# Patient Record
Sex: FEMALE | Race: WHITE | NOT HISPANIC OR LATINO | URBAN - METROPOLITAN AREA
[De-identification: names, ages, dates, MRNs, and addresses within clinical notes are randomized per-mention and may not be internally consistent; named-entity substitution may affect disease eponyms.]

---

## 2016-03-24 RX ORDER — DOCUSATE SODIUM 100 MG
1 CAPSULE ORAL
Qty: 0 | Refills: 0 | COMMUNITY
Start: 2016-03-24 | End: 2016-04-23

## 2016-03-25 RX ORDER — LEVETIRACETAM 250 MG/1
1 TABLET, FILM COATED ORAL
Qty: 0 | Refills: 0 | COMMUNITY
Start: 2016-03-25 | End: 2016-04-24

## 2018-08-23 ENCOUNTER — INPATIENT (INPATIENT)
Facility: HOSPITAL | Age: 83
LOS: 0 days | Discharge: HOME | End: 2018-08-24
Attending: HOSPITALIST | Admitting: HOSPITALIST
Payer: MEDICARE

## 2018-08-23 VITALS
RESPIRATION RATE: 16 BRPM | TEMPERATURE: 98 F | SYSTOLIC BLOOD PRESSURE: 148 MMHG | HEART RATE: 82 BPM | DIASTOLIC BLOOD PRESSURE: 81 MMHG | OXYGEN SATURATION: 98 %

## 2018-08-23 DIAGNOSIS — E03.9 HYPOTHYROIDISM, UNSPECIFIED: ICD-10-CM

## 2018-08-23 DIAGNOSIS — I48.1 PERSISTENT ATRIAL FIBRILLATION: ICD-10-CM

## 2018-08-23 DIAGNOSIS — R41.82 ALTERED MENTAL STATUS, UNSPECIFIED: ICD-10-CM

## 2018-08-23 DIAGNOSIS — Z29.9 ENCOUNTER FOR PROPHYLACTIC MEASURES, UNSPECIFIED: ICD-10-CM

## 2018-08-23 DIAGNOSIS — K57.31 DIVERTICULOSIS OF LARGE INTESTINE WITHOUT PERFORATION OR ABSCESS WITH BLEEDING: ICD-10-CM
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
## 2018-08-23 DIAGNOSIS — G47.00 INSOMNIA, UNSPECIFIED: ICD-10-CM

## 2018-08-23 DIAGNOSIS — R53.1 WEAKNESS: ICD-10-CM

## 2018-08-23 LAB
ALBUMIN SERPL ELPH-MCNC: 3 G/DL — LOW (ref 3.3–5)
ALP SERPL-CCNC: 136 U/L — HIGH (ref 40–120)
ALT FLD-CCNC: 33 U/L — SIGNIFICANT CHANGE UP (ref 4–33)
APPEARANCE UR: CLEAR — SIGNIFICANT CHANGE UP
AST SERPL-CCNC: 100 U/L — HIGH (ref 4–32)
BASOPHILS # BLD AUTO: 0.03 K/UL — SIGNIFICANT CHANGE UP (ref 0–0.2)
BASOPHILS NFR BLD AUTO: 0.4 % — SIGNIFICANT CHANGE UP (ref 0–2)
BILIRUB SERPL-MCNC: 0.8 MG/DL — SIGNIFICANT CHANGE UP (ref 0.2–1.2)
BILIRUB UR-MCNC: NEGATIVE — SIGNIFICANT CHANGE UP
BLOOD UR QL VISUAL: NEGATIVE — SIGNIFICANT CHANGE UP
BUN SERPL-MCNC: 11 MG/DL — SIGNIFICANT CHANGE UP (ref 7–23)
BUN SERPL-MCNC: 12 MG/DL — SIGNIFICANT CHANGE UP (ref 7–23)
CALCIUM SERPL-MCNC: 8.6 MG/DL — SIGNIFICANT CHANGE UP (ref 8.4–10.5)
CALCIUM SERPL-MCNC: 9.3 MG/DL — SIGNIFICANT CHANGE UP (ref 8.4–10.5)
CHLORIDE SERPL-SCNC: 94 MMOL/L — LOW (ref 98–107)
CHLORIDE SERPL-SCNC: 97 MMOL/L — LOW (ref 98–107)
CO2 SERPL-SCNC: 22 MMOL/L — SIGNIFICANT CHANGE UP (ref 22–31)
CO2 SERPL-SCNC: 26 MMOL/L — SIGNIFICANT CHANGE UP (ref 22–31)
COLOR SPEC: YELLOW — SIGNIFICANT CHANGE UP
CREAT SERPL-MCNC: 0.66 MG/DL — SIGNIFICANT CHANGE UP (ref 0.5–1.3)
CREAT SERPL-MCNC: 0.7 MG/DL — SIGNIFICANT CHANGE UP (ref 0.5–1.3)
EOSINOPHIL # BLD AUTO: 0.02 K/UL — SIGNIFICANT CHANGE UP (ref 0–0.5)
EOSINOPHIL NFR BLD AUTO: 0.3 % — SIGNIFICANT CHANGE UP (ref 0–6)
GLUCOSE SERPL-MCNC: 86 MG/DL — SIGNIFICANT CHANGE UP (ref 70–99)
GLUCOSE SERPL-MCNC: 91 MG/DL — SIGNIFICANT CHANGE UP (ref 70–99)
GLUCOSE UR-MCNC: NEGATIVE — SIGNIFICANT CHANGE UP
HCT VFR BLD CALC: 41.1 % — SIGNIFICANT CHANGE UP (ref 34.5–45)
HGB BLD-MCNC: 13.4 G/DL — SIGNIFICANT CHANGE UP (ref 11.5–15.5)
IMM GRANULOCYTES # BLD AUTO: 0.03 # — SIGNIFICANT CHANGE UP
IMM GRANULOCYTES NFR BLD AUTO: 0.4 % — SIGNIFICANT CHANGE UP (ref 0–1.5)
KETONES UR-MCNC: NEGATIVE — SIGNIFICANT CHANGE UP
LEUKOCYTE ESTERASE UR-ACNC: NEGATIVE — SIGNIFICANT CHANGE UP
LYMPHOCYTES # BLD AUTO: 0.68 K/UL — LOW (ref 1–3.3)
LYMPHOCYTES # BLD AUTO: 9.3 % — LOW (ref 13–44)
MAGNESIUM SERPL-MCNC: 2.1 MG/DL — SIGNIFICANT CHANGE UP (ref 1.6–2.6)
MCHC RBC-ENTMCNC: 29.1 PG — SIGNIFICANT CHANGE UP (ref 27–34)
MCHC RBC-ENTMCNC: 32.6 % — SIGNIFICANT CHANGE UP (ref 32–36)
MCV RBC AUTO: 89.2 FL — SIGNIFICANT CHANGE UP (ref 80–100)
MONOCYTES # BLD AUTO: 0.59 K/UL — SIGNIFICANT CHANGE UP (ref 0–0.9)
MONOCYTES NFR BLD AUTO: 8.1 % — SIGNIFICANT CHANGE UP (ref 2–14)
NEUTROPHILS # BLD AUTO: 5.96 K/UL — SIGNIFICANT CHANGE UP (ref 1.8–7.4)
NEUTROPHILS NFR BLD AUTO: 81.5 % — HIGH (ref 43–77)
NITRITE UR-MCNC: NEGATIVE — SIGNIFICANT CHANGE UP
NRBC # FLD: 0 — SIGNIFICANT CHANGE UP
PH UR: 8 — SIGNIFICANT CHANGE UP (ref 5–8)
PHOSPHATE SERPL-MCNC: 3.5 MG/DL — SIGNIFICANT CHANGE UP (ref 2.5–4.5)
PLATELET # BLD AUTO: 208 K/UL — SIGNIFICANT CHANGE UP (ref 150–400)
PMV BLD: 10.3 FL — SIGNIFICANT CHANGE UP (ref 7–13)
POTASSIUM SERPL-MCNC: 4.6 MMOL/L — SIGNIFICANT CHANGE UP (ref 3.5–5.3)
POTASSIUM SERPL-MCNC: SIGNIFICANT CHANGE UP MMOL/L (ref 3.5–5.3)
POTASSIUM SERPL-SCNC: 4.6 MMOL/L — SIGNIFICANT CHANGE UP (ref 3.5–5.3)
POTASSIUM SERPL-SCNC: SIGNIFICANT CHANGE UP MMOL/L (ref 3.5–5.3)
PROT SERPL-MCNC: 6.9 G/DL — SIGNIFICANT CHANGE UP (ref 6–8.3)
PROT UR-MCNC: SIGNIFICANT CHANGE UP
RBC # BLD: 4.61 M/UL — SIGNIFICANT CHANGE UP (ref 3.8–5.2)
RBC # FLD: 15 % — HIGH (ref 10.3–14.5)
SODIUM SERPL-SCNC: 132 MMOL/L — LOW (ref 135–145)
SODIUM SERPL-SCNC: 135 MMOL/L — SIGNIFICANT CHANGE UP (ref 135–145)
SP GR SPEC: 1.01 — SIGNIFICANT CHANGE UP (ref 1–1.04)
TROPONIN T, HIGH SENSITIVITY: 20 NG/L — SIGNIFICANT CHANGE UP (ref ?–14)
TROPONIN T, HIGH SENSITIVITY: 23 NG/L — SIGNIFICANT CHANGE UP (ref ?–14)
TSH SERPL-MCNC: SIGNIFICANT CHANGE UP UIU/ML (ref 0.27–4.2)
UROBILINOGEN FLD QL: NORMAL — SIGNIFICANT CHANGE UP
WBC # BLD: 7.31 K/UL — SIGNIFICANT CHANGE UP (ref 3.8–10.5)
WBC # FLD AUTO: 7.31 K/UL — SIGNIFICANT CHANGE UP (ref 3.8–10.5)

## 2018-08-23 PROCEDURE — 73522 X-RAY EXAM HIPS BI 3-4 VIEWS: CPT | Mod: 26

## 2018-08-23 PROCEDURE — 70450 CT HEAD/BRAIN W/O DYE: CPT | Mod: 26

## 2018-08-23 PROCEDURE — 72125 CT NECK SPINE W/O DYE: CPT | Mod: 26

## 2018-08-23 PROCEDURE — 99223 1ST HOSP IP/OBS HIGH 75: CPT

## 2018-08-23 PROCEDURE — 71045 X-RAY EXAM CHEST 1 VIEW: CPT | Mod: 26

## 2018-08-23 PROCEDURE — 73030 X-RAY EXAM OF SHOULDER: CPT | Mod: 26,RT

## 2018-08-23 RX ORDER — ACETAMINOPHEN 500 MG
650 TABLET ORAL ONCE
Qty: 0 | Refills: 0 | Status: DISCONTINUED | OUTPATIENT
Start: 2018-08-23 | End: 2018-08-23

## 2018-08-23 RX ORDER — HEPARIN SODIUM 5000 [USP'U]/ML
5000 INJECTION INTRAVENOUS; SUBCUTANEOUS EVERY 12 HOURS
Qty: 0 | Refills: 0 | Status: DISCONTINUED | OUTPATIENT
Start: 2018-08-23 | End: 2018-08-24

## 2018-08-23 RX ORDER — CLOPIDOGREL BISULFATE 75 MG/1
1 TABLET, FILM COATED ORAL
Qty: 0 | Refills: 0 | COMMUNITY

## 2018-08-23 RX ORDER — ATORVASTATIN CALCIUM 80 MG/1
20 TABLET, FILM COATED ORAL AT BEDTIME
Qty: 0 | Refills: 0 | Status: DISCONTINUED | OUTPATIENT
Start: 2018-08-23 | End: 2018-08-24

## 2018-08-23 RX ORDER — ACETAMINOPHEN 500 MG
1000 TABLET ORAL ONCE
Qty: 0 | Refills: 0 | Status: COMPLETED | OUTPATIENT
Start: 2018-08-23 | End: 2018-08-23

## 2018-08-23 RX ORDER — RISPERIDONE 4 MG/1
0.5 TABLET ORAL AT BEDTIME
Qty: 0 | Refills: 0 | Status: DISCONTINUED | OUTPATIENT
Start: 2018-08-23 | End: 2018-08-24

## 2018-08-23 RX ORDER — MORPHINE SULFATE 50 MG/1
2 CAPSULE, EXTENDED RELEASE ORAL ONCE
Qty: 0 | Refills: 0 | Status: DISCONTINUED | OUTPATIENT
Start: 2018-08-23 | End: 2018-08-23

## 2018-08-23 RX ORDER — FUROSEMIDE 40 MG
20 TABLET ORAL DAILY
Qty: 0 | Refills: 0 | Status: DISCONTINUED | OUTPATIENT
Start: 2018-08-23 | End: 2018-08-24

## 2018-08-23 RX ORDER — CLOPIDOGREL BISULFATE 75 MG/1
75 TABLET, FILM COATED ORAL DAILY
Qty: 0 | Refills: 0 | Status: DISCONTINUED | OUTPATIENT
Start: 2018-08-23 | End: 2018-08-24

## 2018-08-23 RX ORDER — LEVETIRACETAM 250 MG/1
500 TABLET, FILM COATED ORAL DAILY
Qty: 0 | Refills: 0 | Status: DISCONTINUED | OUTPATIENT
Start: 2018-08-23 | End: 2018-08-24

## 2018-08-23 RX ORDER — LEVOTHYROXINE SODIUM 125 MCG
88 TABLET ORAL DAILY
Qty: 0 | Refills: 0 | Status: DISCONTINUED | OUTPATIENT
Start: 2018-08-23 | End: 2018-08-24

## 2018-08-23 RX ORDER — METOPROLOL TARTRATE 50 MG
50 TABLET ORAL DAILY
Qty: 0 | Refills: 0 | Status: DISCONTINUED | OUTPATIENT
Start: 2018-08-23 | End: 2018-08-24

## 2018-08-23 RX ORDER — FUROSEMIDE 40 MG
1 TABLET ORAL
Qty: 0 | Refills: 0 | COMMUNITY

## 2018-08-23 RX ADMIN — Medication 1000 MILLIGRAM(S): at 14:07

## 2018-08-23 RX ADMIN — ATORVASTATIN CALCIUM 20 MILLIGRAM(S): 80 TABLET, FILM COATED ORAL at 21:42

## 2018-08-23 RX ADMIN — Medication 400 MILLIGRAM(S): at 13:19

## 2018-08-23 NOTE — ED PROVIDER NOTE - PROGRESS NOTE DETAILS
Daughter felt pt is more lethargic but notes she has not slept in 3 days. Pt arousable, no focal deficits. Will admit to continue to monitor for improvement vs worsening dementia.

## 2018-08-23 NOTE — ED PROVIDER NOTE - PMH
Atrial fibrillation  not anticoagulated due to exacerbation of diverticulosis  Breast cancer  left, s/p partial wedge and chemo  CVA (cerebral vascular accident)  hemiparesis dysarthria, facial droop; TPA on 12/5/15, resolution of sx  Diverticulitis    Diverticulosis of colon with hemorrhage    Forgetfulness  not dx'd with dementia as per daughter  Hypothyroidism    Mitral valve regurgitation    Pulmonary hypertension  65mmHg 12/9/15  Rectocele    Uterine prolapse    Vitamin D deficiency

## 2018-08-23 NOTE — ED ADULT NURSE NOTE - OBJECTIVE STATEMENT
pt brought to ed by ems pt fell this morning hurting her left side  daughter at bedside  daughter lives with pt and pts   daughter was sleeping when pt fell  pts daughter states pt has been experiencing hallucinations for past few days and increasing confusion   saline lock 22 g angio right hand  labs sent per orders witals above  ed md denton  in progress will continue to monitor

## 2018-08-23 NOTE — ED PROVIDER NOTE - ATTENDING CONTRIBUTION TO CARE
Pt was seen and evaluated by me. Pt is a 89 y/o female with PMHx of A-fib, CVA, and previous subdural hematoma s/p previous fall presenting to the ED for a fall. Daughter states pt has been at home where she has been managing her care but pt has been up for the past 72 hours. Pt was sitting in bed smalls she reached for something and then fell over. Daughter denies pt had any LOC or vomiting. Pt awake and oriented to person. No abrasions or contusions noted. No midline tenderness. Moving all extremities. Lungs CTA b/l. RRR. Abd soft, non-tender. + distal pulses.

## 2018-08-23 NOTE — ED PROVIDER NOTE - PHYSICAL EXAMINATION
General: well appearing female, no acute distress   HEENT: normocephalic, atraumatic, no cervical midline spinal tenderness   Respiratory: normal work of breathing, lungs clear to auscultation bilaterally   Cardiac: regular rate and rhythm   Abdomen: soft, non-tender   MSK: right hip tenderness to palpation, no tenderness to extremities   Skin: no rashes   Neuro: A&Ox1

## 2018-08-23 NOTE — H&P ADULT - ASSESSMENT
PaulettecrispinAngie is a 89 y/o Female w/PMH Afib (not on anticoagulation b/o diverticulosis bleeding), breast cancer, CVA ( s/p tpa on 12/15), hypothyroidism, pulmonary hypertension, rectocele and uterine prolapse. Patient was brought by daughter for a fall and altered mental status associated with no sleep for the past 72 hours. Imaging including Head/Spinal CT, CXR, Hip xray, and shoulder xray do not show evidence of fracture or bleeding. As per daughter, the patient is usually alert and oriented x3, but has moments of confusion and hallucinations, especially over the past 3 years. No diagnosis of dementia.

## 2018-08-23 NOTE — H&P ADULT - PROBLEM SELECTOR PLAN 1
No source of infection (no fever, urine and cxr both clear)  Delirium labs (RPR, B12, TSH, Free T4)  Daughter to stay with patient while in the hospital No source of infection (no fever, urine and cxr both clear)  Delirium labs (RPR, B12, TSH, Free T4)  If no improvement tomorrow, consider Neuro consult for further evaluation  Daughter to stay with patient while in the hospital

## 2018-08-23 NOTE — ED PROVIDER NOTE - OBJECTIVE STATEMENT
89F, PMH of afib, CVA, subdural hematoma s/p fall, presenting with a fall. History obtained from daughter. Daughter reports the patient has been awake for the past 72 hours. This morning she was playing with a box and the daughter believes she reached out to pick something up and then fell over. No LOC. Complaining of left shoulder pain. Patient denies hitting her head, chest pain, shortness of breathing, abdominal pain.

## 2018-08-23 NOTE — ED ADULT NURSE REASSESSMENT NOTE - NS ED NURSE REASSESS COMMENT FT1
Daughter expressed concern about patient's mental status. Pt asleep most of the time but arousable to verbal stimuli. Dtr reports that patient is more alert and communicative. VS remains stable. MD Conway assessed patient, pt to be admitted.

## 2018-08-23 NOTE — H&P ADULT - HISTORY OF PRESENT ILLNESS
HPI:    Angie Daniels is a 89 y/o Female w/PMH Afib (not on anticoagulation b/o diverticulosis bleeding), breast cancer, CVA ( s/p tpa on 12/15), hypothyroidism, pulmonary hypertension, rectocele and uterine prolapse. The patient was brought in by her daughter who is the main caretaker for this patient. She states that over the past 72 hours the patient did not sleep, she has started to hallucinate seeing small kids around her and people coming through her windows. This morning the patient's daughter heard the patient fall while she was on the commode, when she came in the patient was noted to be on the floor. There was no sign of head trauma or LOC. The patient knows that she is currently in the hospital, but is very slow to respond, overall appearing very lethargic. As per daughter, generally the patient is alert and oriented x3 but with times of fluctuation and hallucinations, especially over the last 3 years.      PAST MEDICAL & SURGICAL HISTORY:  CVA (cerebral vascular accident): hemiparesis dysarthria, facial droop; TPA on 12/5/15, resolution of sx  Pulmonary hypertension: 65mmHg 12/9/15  Forgetfulness: not dx&#x27;d with dementia as per daughter  Diverticulitis  Diverticulosis of colon with hemorrhage  Vitamin D deficiency  Rectocele  Uterine prolapse  Breast cancer: left, s/p partial wedge and chemo  Hypothyroidism  Mitral valve regurgitation  Atrial fibrillation: not anticoagulated due to exacerbation of diverticulosis  H/O hernia repair  H/O: hysterectomy  H/O left mastectomy      Review of Systems:   Unobtainable secondary to mental status    Allergies    ibuprofen (Unknown)  latex (Hives)  NO PARSLEY (Nausea)  Rubber products (Other; Rash)    Intolerances    aspirin (Other)  Coumadin (Other)      Social History:   Patient lives with her daughter and her 97 year old  who is bedbound. Daughter takes care of both with some help.    FAMILY HISTORY:  No pertinent family history in first degree relatives      MEDICATIONS  (STANDING):  heparin  Injectable 5000 Unit(s) SubCutaneous every 12 hours    MEDICATIONS  (PRN):      T(C): 36 (18 @ 16:20), Max: 36.8 (18 @ 10:48)  HR: 69 (18 @ 16:20) (69 - 82)  BP: 130/67 (18 @ 16:20) (130/67 - 158/89)  RR: 18 (18 @ 16:20) (16 - 20)  SpO2: 98% (18 @ 16:20) (98% - 100%)    CAPILLARY BLOOD GLUCOSE        I&O's Summary      PHYSICAL EXAM:  GENERAL: NAD, well-developed, slow to respond. Partially alert to place (knows that she is in a hospital)  HEAD:  Atraumatic, Normocephalic  EYES: PERRLA  NECK: No elevated JVD  CHEST/LUNG: very poor inspiratory effort, no wheezing or crackles noted.  HEART: Regular rate and rhythm; No murmurs, rubs, or gallops  ABDOMEN: Soft, Nondistended; Bowel sounds present  EXTREMITIES:  2+ Peripheral Pulses, Pitting +2 edema B/L  PSYCH: Lethargic  NEUROLOGY: CN II-XII grossly intact, moving all extremities  SKIN: No rashes or lesions    LABS:                        13.4   7.31  )-----------( 208      ( 23 Aug 2018 12:35 )             41.1         135  |  97<L>  |  11  ----------------------------<  86  4.6   |  26  |  0.70    Ca    8.6      23 Aug 2018 16:00  Phos  3.5       Mg     2.1         TPro  6.9  /  Alb  3.0<L>  /  TBili  0.8  /  DBili  x   /  AST  100<H>  /  ALT  33  /  AlkPhos  136<H>            Urinalysis Basic - ( 23 Aug 2018 11:40 )    Color: YELLOW / Appearance: CLEAR / S.014 / pH: 8.0  Gluc: NEGATIVE / Ketone: NEGATIVE  / Bili: NEGATIVE / Urobili: NORMAL   Blood: NEGATIVE / Protein: TRACE / Nitrite: NEGATIVE   Leuk Esterase: NEGATIVE / RBC: x / WBC x   Sq Epi: x / Non Sq Epi: x / Bacteria: x          RADIOLOGY & ADDITIONAL TESTS:    Imaging Personally Reviewed:  Head CT, Cervical CT, Hip X-ray and CXR- performed. No acute changes noted.

## 2018-08-23 NOTE — ED PROVIDER NOTE - MEDICAL DECISION MAKING DETAILS
89F presenting s/p fall. likely mechanical fall however patient is not a reliable historian. previous h/o intracranial hemorrhage. insomnia likely 2/2 acute worsening dementia. concern for syncope vs mechanical fall vs hip fracture. plan for cbc, cmp, trop, tsh, ua, cxr, xray hip, xray shoulder, ct head. will reassess need for admission.

## 2018-08-23 NOTE — SOCIAL WORK INITIAL EVALUATION ADULT - PLAN
Spoke with daughter at bedside in ED.  Patient sleeping.  Patient is a 89 year old , white, [employer] female.  Per daughter, she is alert and oriented x1, with some agitation and confusion.  Daughter reports that patient has not been formally diagnosed with dementia.  Patient uses a walker to ambulate, though daughter reports patient has been unable to walk much for the past few days.    Patient lives with 97 year old spouse who is bedbound and daughter, who cares for both parents with assist from private aide 2x/week.  They live in first floor co-op with 6 steps to enter.   Daughter is retired [employer] [facility] home care RN.  Daughter requesting [employer] [facility] HC (RN, PT, HHA) when patient ready for DC home.  CM and SW to follow.

## 2018-08-23 NOTE — PROVIDER CONTACT NOTE (OTHER) - RECOMMENDATIONS
Patient and family in agreement with the discharge plan. Patient and family in agreement with the discharge plan.   case accepted for SOC tomorrow 08/24

## 2018-08-23 NOTE — ED ADULT TRIAGE NOTE - CHIEF COMPLAINT QUOTE
Pt BIBEMS for witnessed fall this morning, pt was sitting on bed when she fell over, denies any present pain, on plavix, denies cp/discomfort, denies headache/dizziness, breathing even and unlabored.

## 2018-08-24 ENCOUNTER — TRANSCRIPTION ENCOUNTER (OUTPATIENT)
Age: 83
End: 2018-08-24

## 2018-08-24 VITALS
SYSTOLIC BLOOD PRESSURE: 127 MMHG | TEMPERATURE: 97 F | RESPIRATION RATE: 15 BRPM | HEART RATE: 79 BPM | OXYGEN SATURATION: 100 % | DIASTOLIC BLOOD PRESSURE: 56 MMHG

## 2018-08-24 DIAGNOSIS — F03.91 UNSPECIFIED DEMENTIA WITH BEHAVIORAL DISTURBANCE: ICD-10-CM

## 2018-08-24 DIAGNOSIS — R44.3 HALLUCINATIONS, UNSPECIFIED: ICD-10-CM

## 2018-08-24 DIAGNOSIS — F05 DELIRIUM DUE TO KNOWN PHYSIOLOGICAL CONDITION: ICD-10-CM

## 2018-08-24 LAB
FOLATE SERPL-MCNC: > 20 NG/ML — HIGH (ref 4.7–20)
T4 FREE SERPL-MCNC: 1.54 NG/DL — SIGNIFICANT CHANGE UP (ref 0.9–1.8)
TSH SERPL-MCNC: < 0.1 UIU/ML — LOW (ref 0.27–4.2)
VIT B12 SERPL-MCNC: 425 PG/ML — SIGNIFICANT CHANGE UP (ref 200–900)

## 2018-08-24 PROCEDURE — 90792 PSYCH DIAG EVAL W/MED SRVCS: CPT

## 2018-08-24 PROCEDURE — 99239 HOSP IP/OBS DSCHRG MGMT >30: CPT

## 2018-08-24 PROCEDURE — 93010 ELECTROCARDIOGRAM REPORT: CPT

## 2018-08-24 RX ORDER — RISPERIDONE 4 MG/1
0 TABLET ORAL
Qty: 0 | Refills: 0 | COMMUNITY

## 2018-08-24 RX ORDER — RISPERIDONE 4 MG/1
0.5 TABLET ORAL EVERY 6 HOURS
Qty: 0 | Refills: 0 | Status: DISCONTINUED | OUTPATIENT
Start: 2018-08-24 | End: 2018-08-24

## 2018-08-24 RX ORDER — RISPERIDONE 4 MG/1
1 TABLET ORAL
Qty: 0 | Refills: 0 | COMMUNITY
Start: 2018-08-24

## 2018-08-24 RX ORDER — LANOLIN ALCOHOL/MO/W.PET/CERES
3 CREAM (GRAM) TOPICAL AT BEDTIME
Qty: 0 | Refills: 0 | Status: DISCONTINUED | OUTPATIENT
Start: 2018-08-24 | End: 2018-08-24

## 2018-08-24 RX ORDER — LANOLIN ALCOHOL/MO/W.PET/CERES
1 CREAM (GRAM) TOPICAL
Qty: 30 | Refills: 0 | OUTPATIENT
Start: 2018-08-24 | End: 2018-09-22

## 2018-08-24 RX ADMIN — Medication 50 MILLIGRAM(S): at 07:16

## 2018-08-24 RX ADMIN — Medication 20 MILLIGRAM(S): at 07:16

## 2018-08-24 RX ADMIN — HEPARIN SODIUM 5000 UNIT(S): 5000 INJECTION INTRAVENOUS; SUBCUTANEOUS at 07:16

## 2018-08-24 RX ADMIN — Medication 88 MICROGRAM(S): at 06:31

## 2018-08-24 NOTE — BEHAVIORAL HEALTH ASSESSMENT NOTE - OTHER
no cogwheel rigidity in LUE laying down goal directed no cogwheel rigidity in LUE, no stiffness noted on exam some poverty of content noted

## 2018-08-24 NOTE — PROVIDER CONTACT NOTE (OTHER) - BACKGROUND
pt fell at home and plan is for d/c home with RN PT JENIFFER denton List of agencies provided to the patient.  daughter chose [facility] at home
pt admitted for weakness/ fall at home
pt s/p fall at home admitted for weakness

## 2018-08-24 NOTE — DISCHARGE NOTE ADULT - MEDICATION SUMMARY - MEDICATIONS TO TAKE
I will START or STAY ON the medications listed below when I get home from the hospital:    levETIRAcetam 500 mg oral tablet  -- 1 tab(s) by mouth once a day (at bedtime)  -- Indication: For Home medication (antiseizure)    atorvastatin 20 mg oral tablet  -- 1 tab(s) by mouth once a day (at bedtime)  -- Indication: For Increased cholesterol    Plavix 75 mg oral tablet  -- 1 tab(s) by mouth once a day  -- Indication: For blood thinner    risperiDONE 0.5 mg oral tablet  -- 1 tab(s) by mouth once a day (at bedtime)  -- Indication: For Antipsychotic    metoprolol succinate 50 mg oral tablet, extended release  -- 1 tab(s) by mouth once a day  -- Indication: For Persistent atrial fibrillation    Lasix 20 mg oral tablet  -- 1 tab(s) by mouth once a day  -- Indication: For Water pill    docusate sodium 100 mg oral capsule  -- 1 cap(s) by mouth once a day (at bedtime)  -- Indication: For constipation    melatonin 3 mg oral tablet  -- 1 tab(s) by mouth once a day (at bedtime)  -- Indication: For Insomnia, unspecified type    levothyroxine 88 mcg (0.088 mg) oral tablet  -- 1 tab(s) by mouth once a day in morning  -- Indication: For Hypothyroidism, unspecified type    multivitamin  -- 1 tab(s) by mouth once a day  -- Indication: For supplement    cholecalciferol oral tablet  -- 1000 unit(s) by mouth once a day  -- Indication: For supplement

## 2018-08-24 NOTE — PROVIDER CONTACT NOTE (OTHER) - SITUATION
Met and spoke with pt today at bedside regarding d/c plan.  pt lives with spouse and adult daughter who is a retired home care nurse
Pt seems more lethargic
Pt with no IV access

## 2018-08-24 NOTE — BEHAVIORAL HEALTH ASSESSMENT NOTE - NSBHCHARTREVIEWIMAGING_PSY_A_CORE FT
EXAM:  CT BRAIN    PROCEDURE DATE:  Aug 23 2018   INTERPRETATION:  HISTORY: Fall.  Technique: Noncontrast CT of the head and cervical spine was performed.  Multiple contiguous axial images were acquired from the skullbase to the   vertex without the administration of intravenous contrast.    Helically acquired images from the skullbase to the T2 level were   reformatted in coronal and sagittal plane and viewed in bone and soft   tissue window.    COMPARISON: CT head dated 3/17/2016.    FINDINGS:  Head CT:  Prominence of the ventricles and sulci is consistent with age-appropriate   line loss. Hemispheric white matter lucencies are consistent with   moderate to severe severity microvascular ischemic change.    There is no intraparenchymal hematoma, mass effect or midline shift. No   abnormal extra-axial fluid collections or hemorrhages are present.    The calvarium is intact. The visualized intraorbital compartment,   paranasal sinuses and tympanomastoid cavities appear free of acute   disease.      Cervical spine CT:  Alignment is maintained. Vertebral bodies are normal in height, without   evidence of fracture or dislocation. Prevertebral soft tissues are within   normal limits without soft tissue swelling or hematoma.    There is mild multilevel degenerative spondylosis.    Evaluation of the soft tissues demonstrates mild bilateral   atherosclerotic disease at the carotid bifurcations.     The visualized lung apices are within normal limits.    IMPRESSION:  No intracranial hemorrhage.  No cervical spine fracture

## 2018-08-24 NOTE — DISCHARGE NOTE ADULT - SECONDARY DIAGNOSIS.
Fall, initial encounter Weakness Insomnia, unspecified type Diverticulosis of colon with hemorrhage Hypothyroidism, unspecified type Persistent atrial fibrillation

## 2018-08-24 NOTE — DISCHARGE NOTE ADULT - HOSPITAL COURSE
Jeremiah Angie Dao is a 89 y/o Female w/PMH Afib (not on anticoagulation b/o diverticulosis bleeding), breast cancer, CVA ( s/p tpa on 12/15), hypothyroidism, pulmonary hypertension, rectocele and uterine prolapse. JeremiahAngie is a 89 y/o Female w/PMH Afib (not on anticoagulation b/o diverticulosis bleeding), breast cancer, CVA ( s/p tpa on 12/15), hypothyroidism, pulmonary hypertension, rectocele and uterine prolapse.     Altered mental status  -No source of infection (no fever, urine and cxr both clear)  -Delirium labs (RPR, B12, TSH, Free T4)- B12 normal; TSH <0.1, T4 normal  -UA negative  -no sign of infection  -psych consulted- risperidone 0.5 mg at bedtime and melatonin 3mg at bedtime.     Fall, initial encounter  -Appears to be mechanical. CT head negative. Will continue to monitor.    Insomnia  - Will restart home meds.    Persistent atrial fibrillation  -Rate controlled, not a candidate for anticoagulation due to hx of diverticulosis w/bleed.     Diverticulosis of colon with hemorrhage  -Monitor cbc.     Hypothyroidism  -TSH/Free T4 levels- TSH <0.1, t4 normal  -c/w synthroid    PT- home with home PT    stable for discharge home with homecare and PT, d/w Dr Crook

## 2018-08-24 NOTE — DISCHARGE NOTE ADULT - PLAN OF CARE
resolution no signs of infection. psych consulted remain free of falls ambulate with assistance stable continue with melatonin 3mg at bedtime continue with synthroid no signs of infection. psych consulted- risperidone 0.5mg at bedtime and melatonin 3mg at bedtime. Follow up with psychiatrist and PCP outpatient within 1-2 weeks ambulate with assistance. PT home with home PT see above continue with metoprolol, rate controlled

## 2018-08-24 NOTE — BEHAVIORAL HEALTH ASSESSMENT NOTE - DESCRIPTION
Afib (not on anticoagulation b/o diverticulosis bleeding), breast cancer, CVA ( s/p tpa on 12/15 then placed on keppra for prophylaxis), hypothyroidism, pulmonary hypertension, rectocele and uterine prolapse

## 2018-08-24 NOTE — BEHAVIORAL HEALTH ASSESSMENT NOTE - CASE SUMMARY
89 year old female, , lives with daughter and , with PPH of dementia (daughter calls it- forgetfulness, though unclear if ever formally diagnosed) and PMH significant for Afib (not on anticoagulation b/o diverticulosis bleeding), breast cancer, CVA ( s/p tpa on 12/15 then placed on keppra for prophylaxis), hypothyroidism, pulmonary hypertension, rectocele and uterine prolapse brought by daughter for a fall and change in mental status associated with no sleep for the past 72 hours and hallucinations. Psych CL consulted for evaluation. Patient seen and evaluated, calm, cooperative, AAOX2, denies AH and VH, denies SI and HI. Presentation is c./w likely delirium superimposed on underlying cognitive impairment. Recommend meds. as written above, monitor EKG for qtc, case discussed with primary team.

## 2018-08-24 NOTE — BEHAVIORAL HEALTH ASSESSMENT NOTE - NSBHCHARTREVIEWLAB_PSY_A_CORE FT
13.4   7.31  )-----------( 208      ( 23 Aug 2018 12:35 )             41.1   08-23    135  |  97<L>  |  11  ----------------------------<  86  4.6   |  26  |  0.70    Ca    8.6      23 Aug 2018 16:00  Phos  3.5     08-23  Mg     2.1     08-23    TPro  6.9  /  Alb  3.0<L>  /  TBili  0.8  /  DBili  x   /  AST  100<H>  /  ALT  33  /  AlkPhos  136<H>  08-23

## 2018-08-24 NOTE — PHYSICAL THERAPY INITIAL EVALUATION ADULT - PERTINENT HX OF CURRENT PROBLEM, REHAB EVAL
Pt. is a 89 year old female admitted to [facility] secondary to weakness, s/p fall. PMH: CVA, Uterine prolapse, diverticulitis, atrial fibrillation.

## 2018-08-24 NOTE — DISCHARGE NOTE ADULT - ADDITIONAL INSTRUCTIONS
[facility] Geriatric outpt. clinic: [phone]  [facility] outpt. walk in clinic : [phone] (9AM-7PM)  [facility] Outpatient clinic: [phone]

## 2018-08-24 NOTE — BEHAVIORAL HEALTH ASSESSMENT NOTE - NSBHCHARTREVIEWVS_PSY_A_CORE FT
Vital Signs Last 24 Hrs  T(C): 36.4 (24 Aug 2018 09:56), Max: 36.4 (24 Aug 2018 06:28)  T(F): 97.6 (24 Aug 2018 09:56), Max: 97.6 (24 Aug 2018 09:56)  HR: 80 (24 Aug 2018 09:56) (58 - 80)  BP: 109/77 (24 Aug 2018 09:56) (109/77 - 130/67)  BP(mean): --  RR: 16 (24 Aug 2018 09:56) (16 - 18)  SpO2: 100% (24 Aug 2018 09:56) (98% - 100%)

## 2018-08-24 NOTE — PHYSICAL THERAPY INITIAL EVALUATION ADULT - ADDITIONAL COMMENTS
Pt. lives in a pvt home with their spouse and daughter. Pt. has steps at home however, as per daughter pt. does not negotiate stairs. Pt. primarily wheelchair bound and ambulates a few steps with assistance for transfers from bed<>chair. Pt. requires assistance for all ADLs. Pt. returned to bed at end of therapy session with all lines and tubes intact, call bell in reach and in NAD. Pt. owns a rolling walker at home

## 2018-08-24 NOTE — BEHAVIORAL HEALTH ASSESSMENT NOTE - NSBHCONSULTFOLLOWAFTERCARE_PSY_A_CORE FT
[facility] [phone] [facility] Geriatric outpt. clinic: [phone]  [facility] outpt. walk in clinic : [phone] (9AM-7PM)  [facility] Outpatient clinic: [phone]

## 2018-08-24 NOTE — PROGRESS NOTE ADULT - PROBLEM SELECTOR PLAN 4
Rate controlled, not a candidate for anticoagulation due to hx of diverticulosis w/bleed. EKG reviewed: Qtc: 443

## 2018-08-24 NOTE — BEHAVIORAL HEALTH ASSESSMENT NOTE - RISK ASSESSMENT
Patient denies SI/HI, has no formal psychiatric history, and has supportive family. Therefore her imminent risk for self harm is low.

## 2018-08-24 NOTE — PROGRESS NOTE ADULT - SUBJECTIVE AND OBJECTIVE BOX
Patient is a 89y old  Female who presents with a chief complaint of Fall and Altered Mental Status (24 Aug 2018 11:21)      SUBJECTIVE / OVERNIGHT EVENTS: No acute events overnight. Per daughter, patient not sleeping much and having hallucinations for past 3 days seeing things not actually present.    MEDICATIONS  (STANDING):  atorvastatin 20 milliGRAM(s) Oral at bedtime  clopidogrel Tablet 75 milliGRAM(s) Oral daily  furosemide    Tablet 20 milliGRAM(s) Oral daily  heparin  Injectable 5000 Unit(s) SubCutaneous every 12 hours  levETIRAcetam 500 milliGRAM(s) Oral daily  levothyroxine 88 MICROGram(s) Oral daily  melatonin 3 milliGRAM(s) Oral at bedtime  metoprolol succinate ER 50 milliGRAM(s) Oral daily  risperiDONE   Tablet 0.5 milliGRAM(s) Oral at bedtime    MEDICATIONS  (PRN):  risperiDONE   Tablet 0.5 milliGRAM(s) Oral every 6 hours PRN agitation      T(C): 36.4 (18 @ 09:56), Max: 36.4 (18 @ 06:28)  HR: 80 (18 @ 09:56) (58 - 80)  BP: 109/77 (18 @ 09:56) (109/77 - 158/89)  RR: 16 (18 @ 09:56) (16 - 20)  SpO2: 100% (18 @ 09:56) (98% - 100%)  CAPILLARY BLOOD GLUCOSE      POCT Blood Glucose.: 80 mg/dL (23 Aug 2018 21:23)    I&O's Summary    24 Aug 2018 07:01  -  24 Aug 2018 12:10  --------------------------------------------------------  IN: 0 mL / OUT: 300 mL / NET: -300 mL        PHYSICAL EXAM:  GENERAL: elderly female, no apparent distress, on room air  HEAD:  Atraumatic, Normocephalic  EYES: sclera clear b/l  CHEST/LUNG: Clear to auscultation bilaterally; No wheezing or crackles  HEART: s1/s2, systolic murmur  ABDOMEN: Soft, Nontender, Nondistended; Bowel sounds present  EXTREMITIES:  No clubbing, cyanosis, or edema  NEUROLOGY: awake, alert, responds to Qs, follows commands  PSYCH: calm, awake, oriented to person, place, states season is fall but did not know month or year  SKIN: No rashes or lesions    LABS:                        13.4   7.31  )-----------( 208      ( 23 Aug 2018 12:35 )             41.1     -    135  |  97<L>  |  11  ----------------------------<  86  4.6   |  26  |  0.70    Ca    8.6      23 Aug 2018 16:00  Phos  3.5       Mg     2.1         TPro  6.9  /  Alb  3.0<L>  /  TBili  0.8  /  DBili  x   /  AST  100<H>  /  ALT  33  /  AlkPhos  136<H>            Urinalysis Basic - ( 23 Aug 2018 11:40 )    Color: YELLOW / Appearance: CLEAR / S.014 / pH: 8.0  Gluc: NEGATIVE / Ketone: NEGATIVE  / Bili: NEGATIVE / Urobili: NORMAL   Blood: NEGATIVE / Protein: TRACE / Nitrite: NEGATIVE   Leuk Esterase: NEGATIVE / RBC: x / WBC x   Sq Epi: x / Non Sq Epi: x / Bacteria: x        RADIOLOGY & ADDITIONAL TESTS:

## 2018-08-24 NOTE — BEHAVIORAL HEALTH ASSESSMENT NOTE - SUMMARY
89 year old female, , lives with daughter and , with PPH of dementia (daughter calls it- forgetfulness, though unclear if ever formally diagnosed) and PMH significant for Afib (not on anticoagulation b/o diverticulosis bleeding), breast cancer, CVA ( s/p tpa on 12/15 then placed on keppra for prophylaxis), hypothyroidism, pulmonary hypertension, rectocele and uterine prolapse brought by daughter for a fall and change in mental status associated with no sleep for the past 72 hours and hallucinations.  Psych CL consulted for evaluation.    Patient likely has an underlying dementia with behavioral disturbance. We would keep delirium on differential. Would also consider LBD.  For now:  - continue risperidone 0.5mg HS  - start melatonin 3mg HS  - consider cEEG if concern for seizures?  - consider neuro consult for concern of LBD?  - consider SW consult to bolster supports at home and see what services/specialties can pay home visits for evaluations (home neuro/psych?)    Agitation:  - try verbal redirection first  - risperidone 0.5mg q6h prn for agitation  - haldol 0.25mg q6h prn for SEVERE agitation 89 year old female, , lives with daughter and , with PPH of dementia (daughter calls it- forgetfulness, though unclear if ever formally diagnosed) and PMH significant for Afib (not on anticoagulation b/o diverticulosis bleeding), breast cancer, CVA ( s/p tpa on 12/15 then placed on keppra for prophylaxis), hypothyroidism, pulmonary hypertension, rectocele and uterine prolapse brought by daughter for a fall and change in mental status associated with no sleep for the past 72 hours and hallucinations. Psych CL consulted for evaluation.    Patient likely has an underlying dementia with behavioral disturbance. We would keep delirium on differential. Would also consider LBD.  For now:  - continue risperidone 0.5mg HS (HOLD for sedation)  - start melatonin 3mg HS  - consider cEEG if concern for seizures?  - consider neuro consult for concern of LBD?  - consider SW consult to bolster supports at home and see what services/specialties can pay home visits for evaluations (home neuro/psych?)    Agitation:  - try verbal redirection first  - risperidone 0.5mg q6h prn for moderate agitation  - haldol 0.25mg IV/IM q6h prn ONLY for SEVERE agitation

## 2018-08-24 NOTE — DISCHARGE NOTE ADULT - CARE PLAN
Principal Discharge DX:	Altered mental status, unspecified altered mental status type  Goal:	resolution  Assessment and plan of treatment:	no signs of infection. psych consulted  Secondary Diagnosis:	Fall, initial encounter  Goal:	remain free of falls  Assessment and plan of treatment:	ambulate with assistance  Secondary Diagnosis:	Weakness  Goal:	stable  Secondary Diagnosis:	Insomnia, unspecified type  Assessment and plan of treatment:	continue with melatonin 3mg at bedtime  Secondary Diagnosis:	Diverticulosis of colon with hemorrhage  Goal:	stable  Secondary Diagnosis:	Hypothyroidism, unspecified type  Assessment and plan of treatment:	continue with synthroid  Secondary Diagnosis:	Persistent atrial fibrillation  Goal:	stable Principal Discharge DX:	Altered mental status, unspecified altered mental status type  Goal:	resolution  Assessment and plan of treatment:	no signs of infection. psych consulted- risperidone 0.5mg at bedtime and melatonin 3mg at bedtime. Follow up with psychiatrist and PCP outpatient within 1-2 weeks  Secondary Diagnosis:	Fall, initial encounter  Goal:	remain free of falls  Assessment and plan of treatment:	ambulate with assistance. PT home with home PT  Secondary Diagnosis:	Weakness  Goal:	stable  Assessment and plan of treatment:	see above  Secondary Diagnosis:	Insomnia, unspecified type  Assessment and plan of treatment:	continue with melatonin 3mg at bedtime  Secondary Diagnosis:	Diverticulosis of colon with hemorrhage  Goal:	stable  Secondary Diagnosis:	Hypothyroidism, unspecified type  Assessment and plan of treatment:	continue with synthroid  Secondary Diagnosis:	Persistent atrial fibrillation  Goal:	stable  Assessment and plan of treatment:	continue with metoprolol, rate controlled

## 2018-08-24 NOTE — BEHAVIORAL HEALTH ASSESSMENT NOTE - HPI (INCLUDE ILLNESS QUALITY, SEVERITY, DURATION, TIMING, CONTEXT, MODIFYING FACTORS, ASSOCIATED SIGNS AND SYMPTOMS)
89 year old female, , lives with daughter and , with PPH of dementia (daughter calls it- forgetfulness, though unclear if ever formally diagnosed) and PMH significant for Afib (not on anticoagulation b/o diverticulosis bleeding), breast cancer, CVA ( s/p tpa on 12/15 then placed on keppra for prophylaxis), hypothyroidism, pulmonary hypertension, rectocele and uterine prolapse brought by daughter for a fall and change in mental status associated with no sleep for the past 72 hours and hallucinations.  Psych CL consulted for evaluation.  Patient seen at bedside, daughter at beside as well. Patient is sleeping but easily arousable. Daughter states that patient had not been sleeping well, and has been hallucinating- seeing little people that are not there. Daughter states that this had happened in the past- and patient's cardiologist prescribed risperidone 0.5mg BID. As daughter felt that patient was too sedated- the medication was changed to 0.5 HS. Daughter also said that keppra was at one point started 500mg BID for seizure ppx, but then switched to daily dosing.    Patient states that she is doing well. She knows she is in a hospital- but believes it is [facility]. She does not know the date. She denies AH/VH currently. Denies SI/HI. She makes a joke that her mother is sitting next to her- daughter acknowledges that she is joking too.    Explained to daughter and patient that we can try melatonin to see if this helps with sleep wake cycle, and continue with risperidone for now. 89 year old female, , lives with daughter and , with PPH of dementia (daughter calls it- forgetfulness, though unclear if ever formally diagnosed) and PMH significant for Afib (not on anticoagulation b/o diverticulosis bleeding), breast cancer, CVA ( s/p tpa on 12/15 then placed on keppra for prophylaxis), hypothyroidism, pulmonary hypertension, rectocele and uterine prolapse brought by daughter for a fall and change in mental status associated with no sleep for the past 72 hours and hallucinations.  Psych CL consulted for evaluation.  Patient seen at bedside, daughter at beside as well. Patient is sleeping but easily arousable. Daughter states that patient had not been sleeping well, and has been hallucinating- seeing little people that are not there. Daughter states that this had happened in the past- and patient's cardiologist prescribed risperidone 0.5mg BID. As daughter felt that patient was too sedated- the medication was changed to 0.5 HS. Daughter also said that keppra was at one point started 500mg BID for seizure ppx, but then switched to daily dosing.    Patient states that she is doing well. She knows she is in a hospital- but believes it is [facility]. She does not know the date. She denies AH/VH currently. Denies SI/HI. She makes a joke that her mother is sitting next to her- daughter acknowledges that she is joking too.    Explained to daughter and patient that we can try melatonin to see if this helps with sleep wake cycle, and continue with risperidone for now.    Educated daughter about black box warning and use of antipsychotic in dementia with behavioral disturbances vs delirium. She verbalized understanding and agreed with the plan.

## 2018-08-24 NOTE — PROGRESS NOTE ADULT - ASSESSMENT
89 y/o Female w/PMH Afib (not on anticoagulation b/o diverticulosis bleeding), breast cancer, CVA ( s/p tpa on 12/15 then placed on keppra for prophylaxis), hypothyroidism, pulmonary hypertension, rectocele and uterine prolapse brought by daughter for a fall and change in mental status associated with no sleep for the past 72 hours and hallucinations.

## 2018-08-24 NOTE — DISCHARGE NOTE ADULT - MEDICATION SUMMARY - MEDICATIONS TO CHANGE
I will SWITCH the dose or number of times a day I take the medications listed below when I get home from the hospital:    RisperDAL 0.5 mg oral tablet  -- 1 tab by mouth in the morning and 1 tab at bedtime if needed

## 2018-08-24 NOTE — PROGRESS NOTE ADULT - PROBLEM SELECTOR PLAN 1
no reported diagnosis of dementia but per daughter, has episodes of confusion and forgetfulness. Per daughter was placed on keppra after bleed in brain, no seizure history. Keppra was being tapered off outpt but when taken off patient become more forgetful therefore placed back on keppra per daughter  No source of infection (no fever, urine and cxr both clear)  Delirium labs (RPR, B12, TSH, Free T4) unremarkable  Geripsych consult  Continue risperidone

## 2018-08-24 NOTE — PROVIDER CONTACT NOTE (OTHER) - ASSESSMENT
referral sent as requested and pt will be d/c home by ambulance
Pt IV came out. Pt not on any IV medications or receiving IV fluids.
Pt seems more lethargic. Pt daughter states her mother does not seem like her self, and she is never this difficult to arouse. Pt's daughter is also concerned because patient had not eaten or drank anything for days.

## 2018-08-24 NOTE — DISCHARGE NOTE ADULT - PATIENT PORTAL LINK FT
You can access the [facility] Patient Portal, offered by [facility], by registering with the following website: [email or web address]

## 2018-08-25 LAB
BACTERIA UR CULT: SIGNIFICANT CHANGE UP
SPECIMEN SOURCE: SIGNIFICANT CHANGE UP

## 2018-12-08 RX ORDER — LEVOTHYROXINE SODIUM 125 MCG
1 TABLET ORAL
Qty: 90 | Refills: 3 | OUTPATIENT
Start: 2018-12-08 | End: 2019-12-02

## 2018-12-24 ENCOUNTER — EMERGENCY (EMERGENCY)
Facility: HOSPITAL | Age: 83
LOS: 1 days | Discharge: HOME | End: 2018-12-24
Attending: EMERGENCY MEDICINE | Admitting: EMERGENCY MEDICINE
Payer: MEDICARE

## 2018-12-24 VITALS
RESPIRATION RATE: 16 BRPM | TEMPERATURE: 96 F | SYSTOLIC BLOOD PRESSURE: 99 MMHG | DIASTOLIC BLOOD PRESSURE: 63 MMHG | OXYGEN SATURATION: 95 % | HEART RATE: 77 BPM

## 2018-12-24 VITALS
RESPIRATION RATE: 18 BRPM | DIASTOLIC BLOOD PRESSURE: 60 MMHG | SYSTOLIC BLOOD PRESSURE: 103 MMHG | TEMPERATURE: 97 F | OXYGEN SATURATION: 100 % | HEART RATE: 77 BPM

## 2018-12-24 LAB
ALBUMIN SERPL ELPH-MCNC: 2.4 G/DL — LOW (ref 3.3–5)
ALP SERPL-CCNC: 155 U/L — HIGH (ref 40–120)
ALT FLD-CCNC: 65 U/L — HIGH (ref 4–33)
APTT BLD: 31 SEC — SIGNIFICANT CHANGE UP (ref 27.5–36.3)
AST SERPL-CCNC: 51 U/L — HIGH (ref 4–32)
BASOPHILS # BLD AUTO: 0.02 K/UL — SIGNIFICANT CHANGE UP (ref 0–0.2)
BASOPHILS NFR BLD AUTO: 0.3 % — SIGNIFICANT CHANGE UP (ref 0–2)
BILIRUB SERPL-MCNC: 1.7 MG/DL — HIGH (ref 0.2–1.2)
BUN SERPL-MCNC: 40 MG/DL — HIGH (ref 7–23)
BUN SERPL-MCNC: 43 MG/DL — HIGH (ref 7–23)
CALCIUM SERPL-MCNC: 7.7 MG/DL — LOW (ref 8.4–10.5)
CALCIUM SERPL-MCNC: 8.7 MG/DL — SIGNIFICANT CHANGE UP (ref 8.4–10.5)
CHLORIDE SERPL-SCNC: 101 MMOL/L — SIGNIFICANT CHANGE UP (ref 98–107)
CHLORIDE SERPL-SCNC: 106 MMOL/L — SIGNIFICANT CHANGE UP (ref 98–107)
CO2 SERPL-SCNC: 24 MMOL/L — SIGNIFICANT CHANGE UP (ref 22–31)
CO2 SERPL-SCNC: 32 MMOL/L — HIGH (ref 22–31)
CREAT SERPL-MCNC: 1.55 MG/DL — HIGH (ref 0.5–1.3)
CREAT SERPL-MCNC: 1.76 MG/DL — HIGH (ref 0.5–1.3)
EOSINOPHIL # BLD AUTO: 0.05 K/UL — SIGNIFICANT CHANGE UP (ref 0–0.5)
EOSINOPHIL NFR BLD AUTO: 0.7 % — SIGNIFICANT CHANGE UP (ref 0–6)
GLUCOSE SERPL-MCNC: 75 MG/DL — SIGNIFICANT CHANGE UP (ref 70–99)
GLUCOSE SERPL-MCNC: 79 MG/DL — SIGNIFICANT CHANGE UP (ref 70–99)
HCT VFR BLD CALC: 33.6 % — LOW (ref 34.5–45)
HGB BLD-MCNC: 10.7 G/DL — LOW (ref 11.5–15.5)
IMM GRANULOCYTES # BLD AUTO: 0.05 # — SIGNIFICANT CHANGE UP
IMM GRANULOCYTES NFR BLD AUTO: 0.7 % — SIGNIFICANT CHANGE UP (ref 0–1.5)
INR BLD: 1.17 — SIGNIFICANT CHANGE UP (ref 0.88–1.17)
LYMPHOCYTES # BLD AUTO: 0.95 K/UL — LOW (ref 1–3.3)
LYMPHOCYTES # BLD AUTO: 12.4 % — LOW (ref 13–44)
MCHC RBC-ENTMCNC: 30.7 PG — SIGNIFICANT CHANGE UP (ref 27–34)
MCHC RBC-ENTMCNC: 31.8 % — LOW (ref 32–36)
MCV RBC AUTO: 96.3 FL — SIGNIFICANT CHANGE UP (ref 80–100)
MONOCYTES # BLD AUTO: 0.7 K/UL — SIGNIFICANT CHANGE UP (ref 0–0.9)
MONOCYTES NFR BLD AUTO: 9.1 % — SIGNIFICANT CHANGE UP (ref 2–14)
NEUTROPHILS # BLD AUTO: 5.89 K/UL — SIGNIFICANT CHANGE UP (ref 1.8–7.4)
NEUTROPHILS NFR BLD AUTO: 76.8 % — SIGNIFICANT CHANGE UP (ref 43–77)
NRBC # FLD: 0 — SIGNIFICANT CHANGE UP
PLATELET # BLD AUTO: 155 K/UL — SIGNIFICANT CHANGE UP (ref 150–400)
PMV BLD: 11.3 FL — SIGNIFICANT CHANGE UP (ref 7–13)
POTASSIUM SERPL-MCNC: 4.6 MMOL/L — SIGNIFICANT CHANGE UP (ref 3.5–5.3)
POTASSIUM SERPL-MCNC: 5.4 MMOL/L — HIGH (ref 3.5–5.3)
POTASSIUM SERPL-SCNC: 4.6 MMOL/L — SIGNIFICANT CHANGE UP (ref 3.5–5.3)
POTASSIUM SERPL-SCNC: 5.4 MMOL/L — HIGH (ref 3.5–5.3)
PROT SERPL-MCNC: 5.1 G/DL — LOW (ref 6–8.3)
PROTHROM AB SERPL-ACNC: 13.1 SEC — SIGNIFICANT CHANGE UP (ref 9.8–13.1)
RBC # BLD: 3.49 M/UL — LOW (ref 3.8–5.2)
RBC # FLD: 16.9 % — HIGH (ref 10.3–14.5)
SODIUM SERPL-SCNC: 141 MMOL/L — SIGNIFICANT CHANGE UP (ref 135–145)
SODIUM SERPL-SCNC: 141 MMOL/L — SIGNIFICANT CHANGE UP (ref 135–145)
WBC # BLD: 7.66 K/UL — SIGNIFICANT CHANGE UP (ref 3.8–10.5)
WBC # FLD AUTO: 7.66 K/UL — SIGNIFICANT CHANGE UP (ref 3.8–10.5)

## 2018-12-24 PROCEDURE — 71045 X-RAY EXAM CHEST 1 VIEW: CPT | Mod: 26

## 2018-12-24 PROCEDURE — 71250 CT THORAX DX C-: CPT | Mod: 26

## 2018-12-24 PROCEDURE — 99284 EMERGENCY DEPT VISIT MOD MDM: CPT

## 2018-12-24 PROCEDURE — 73080 X-RAY EXAM OF ELBOW: CPT | Mod: 26,LT

## 2018-12-24 PROCEDURE — 73060 X-RAY EXAM OF HUMERUS: CPT | Mod: 26,LT

## 2018-12-24 RX ORDER — SODIUM CHLORIDE 9 MG/ML
1000 INJECTION INTRAMUSCULAR; INTRAVENOUS; SUBCUTANEOUS ONCE
Qty: 0 | Refills: 0 | Status: COMPLETED | OUTPATIENT
Start: 2018-12-24 | End: 2018-12-24

## 2018-12-24 RX ORDER — ACETAMINOPHEN 500 MG
1000 TABLET ORAL ONCE
Qty: 0 | Refills: 0 | Status: COMPLETED | OUTPATIENT
Start: 2018-12-24 | End: 2018-12-24

## 2018-12-24 RX ADMIN — Medication 1000 MILLIGRAM(S): at 21:30

## 2018-12-24 RX ADMIN — Medication 400 MILLIGRAM(S): at 21:00

## 2018-12-24 RX ADMIN — SODIUM CHLORIDE 1000 MILLILITER(S): 9 INJECTION INTRAMUSCULAR; INTRAVENOUS; SUBCUTANEOUS at 20:57

## 2018-12-24 NOTE — ED PROVIDER NOTE - NONTENDER LOCATION
left upper quadrant/umbilical/right costovertebral angle/right upper quadrant/right lower quadrant/periumbilical/suprapubic/left costovertebral angle/left lower quadrant
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
## 2018-12-24 NOTE — ED ADULT NURSE NOTE - NSIMPLEMENTINTERV_GEN_ALL_ED
Implemented All Fall with Harm Risk Interventions:  [place] to call system. Call bell, personal items and telephone within reach. Instruct patient to call for assistance. Room bathroom lighting operational. Non-slip footwear when patient is off stretcher. Physically safe environment: no spills, clutter or unnecessary equipment. Stretcher in lowest position, wheels locked, appropriate side rails in place. Provide visual cue, wrist band, yellow gown, etc. Monitor gait and stability. Monitor for mental status changes and reorient to person, place, and time. Review medications for side effects contributing to fall risk. Reinforce activity limits and safety measures with patient and family. Provide visual clues: red socks.

## 2018-12-24 NOTE — ED PROVIDER NOTE - MEDICAL DECISION MAKING DETAILS
Pt is a 89 y/o F nonsmoker PMhx Afib, breast cancer, CVA (on Plavix), hypothyroidism p/w bruising to chest, back and arm x 4 days -- likely contusion -- labs, cxr

## 2018-12-24 NOTE — ED PROVIDER NOTE - NSFOLLOWUPINSTRUCTIONS_ED_ALL_ED_FT
You were seen at [facility] ER for bruising fot eh thorax. You had a normal Chest Xray and CT scan without fractures. You however, had an elevation in your creatinine indicating dehydration. You were given fluids and the creatinine was improved. She is to continue hydrating orally at home, hold her Plavix until Wednesday. You should return if she stops taking in fluids and food or for any other concerns or worsening symptoms.
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
## 2018-12-24 NOTE — ED ADULT NURSE REASSESSMENT NOTE - NS ED NURSE REASSESS COMMENT FT1
covering primary RN for break pt is in bed alert, verbal but words incomprehensive and unable to follow commands or answer questions, as per daughter at bedside pt is at baseline.  fall precautions in place. pending re-eval/dispo
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
## 2018-12-24 NOTE — ED PROVIDER NOTE - PLAN OF CARE
You were seen at [facility] ER for bruising fot eh thorax. You had a normal Chest Xray and CT scan without fractures. You however, had an elevation in your creatinine indicating dehydration. You were given fluids and the creatinine was improved. She is to continue hydrating orally at home, hold her Plavix until Wednesday. You should return if she stops taking in fluids and food or for any other concerns or worsening symptoms.

## 2018-12-24 NOTE — ED PROVIDER NOTE - PROGRESS NOTE DETAILS
Xray negative, one view only, will get CT chest to evaluate further for rib fx's miki: +VY. CT negative for acute injury. Pt with decreased PO intake at home. will hydrate with IVFs and repeat BMP. Admit if no improvement. patient with improved CR and daughter says she has been eating ensure and bananas. Straight cath with 100cc urine and not retaining. Spoke to Dr. Price and will d.c home holding plavix until Wednesday.   -Gary Koch PGY4 [facility] [employer]#[id number]

## 2018-12-24 NOTE — ED PROVIDER NOTE - CARE PLAN
Principal Discharge DX:	Bruising  Assessment and plan of treatment:	You were seen at [facility] ER for bruising fot eh thorax. You had a normal Chest Xray and CT scan without fractures. You however, had an elevation in your creatinine indicating dehydration. You were given fluids and the creatinine was improved. She is to continue hydrating orally at home, hold her Plavix until Wednesday. You should return if she stops taking in fluids and food or for any other concerns or worsening symptoms.  Secondary Diagnosis:	VY (acute kidney injury)

## 2018-12-25 RX ORDER — LIDOCAINE 4 G/100G
2 CREAM TOPICAL ONCE
Qty: 0 | Refills: 0 | Status: COMPLETED | OUTPATIENT
Start: 2018-12-25 | End: 2018-12-25

## 2018-12-25 RX ADMIN — LIDOCAINE 2 PATCH: 4 CREAM TOPICAL at 02:05
